# Patient Record
Sex: MALE | Race: WHITE | NOT HISPANIC OR LATINO | Employment: FULL TIME | URBAN - METROPOLITAN AREA
[De-identification: names, ages, dates, MRNs, and addresses within clinical notes are randomized per-mention and may not be internally consistent; named-entity substitution may affect disease eponyms.]

---

## 2019-11-18 ENCOUNTER — HISTORICAL (OUTPATIENT)
Dept: ADMINISTRATIVE | Facility: HOSPITAL | Age: 44
End: 2019-11-18

## 2020-10-07 ENCOUNTER — HISTORICAL (OUTPATIENT)
Dept: ADMINISTRATIVE | Facility: HOSPITAL | Age: 45
End: 2020-10-07

## 2020-11-01 LAB
INFLUENZA A ANTIGEN, POC: NEGATIVE
INFLUENZA B ANTIGEN, POC: NEGATIVE

## 2022-04-10 ENCOUNTER — HISTORICAL (OUTPATIENT)
Dept: ADMINISTRATIVE | Facility: HOSPITAL | Age: 47
End: 2022-04-10
Payer: COMMERCIAL

## 2022-04-11 ENCOUNTER — HISTORICAL (OUTPATIENT)
Dept: ADMINISTRATIVE | Facility: HOSPITAL | Age: 47
End: 2022-04-11
Payer: COMMERCIAL

## 2022-04-28 VITALS
HEIGHT: 69 IN | SYSTOLIC BLOOD PRESSURE: 144 MMHG | DIASTOLIC BLOOD PRESSURE: 72 MMHG | OXYGEN SATURATION: 98 % | WEIGHT: 216 LBS | BODY MASS INDEX: 31.99 KG/M2

## 2022-04-28 VITALS
SYSTOLIC BLOOD PRESSURE: 144 MMHG | DIASTOLIC BLOOD PRESSURE: 72 MMHG | OXYGEN SATURATION: 98 % | WEIGHT: 216 LBS | BODY MASS INDEX: 31.99 KG/M2 | HEIGHT: 69 IN

## 2022-06-17 ENCOUNTER — LAB VISIT (OUTPATIENT)
Dept: LAB | Facility: HOSPITAL | Age: 47
End: 2022-06-17
Attending: INTERNAL MEDICINE
Payer: COMMERCIAL

## 2022-06-17 DIAGNOSIS — E55.9 VITAMIN D DEFICIENCY: ICD-10-CM

## 2022-06-17 DIAGNOSIS — Z00.00 ROUTINE GENERAL MEDICAL EXAMINATION AT A HEALTH CARE FACILITY: ICD-10-CM

## 2022-06-17 DIAGNOSIS — I10 ESSENTIAL HYPERTENSION, MALIGNANT: Primary | ICD-10-CM

## 2022-06-17 DIAGNOSIS — Z12.5 SPECIAL SCREENING FOR MALIGNANT NEOPLASM OF PROSTATE: ICD-10-CM

## 2022-06-17 LAB
ALBUMIN SERPL-MCNC: 4.1 GM/DL (ref 3.5–5)
ALBUMIN/GLOB SERPL: 1.5 RATIO (ref 1.1–2)
ALP SERPL-CCNC: 46 UNIT/L (ref 40–150)
ALT SERPL-CCNC: 29 UNIT/L (ref 0–55)
AST SERPL-CCNC: 16 UNIT/L (ref 5–34)
BILIRUBIN DIRECT+TOT PNL SERPL-MCNC: 1 MG/DL
BUN SERPL-MCNC: 16.2 MG/DL (ref 8.9–20.6)
CALCIUM SERPL-MCNC: 10.6 MG/DL (ref 8.4–10.2)
CHLORIDE SERPL-SCNC: 111 MMOL/L (ref 98–107)
CHOLEST SERPL-MCNC: 171 MG/DL
CHOLEST/HDLC SERPL: 3 {RATIO} (ref 0–5)
CO2 SERPL-SCNC: 26 MMOL/L (ref 22–29)
CREAT SERPL-MCNC: 0.89 MG/DL (ref 0.73–1.18)
DEPRECATED CALCIDIOL+CALCIFEROL SERPL-MC: 24.2 NG/ML (ref 30–80)
ERYTHROCYTE [DISTWIDTH] IN BLOOD BY AUTOMATED COUNT: 12.6 % (ref 11.5–17)
EST. AVERAGE GLUCOSE BLD GHB EST-MCNC: 108.3 MG/DL
GLOBULIN SER-MCNC: 2.7 GM/DL (ref 2.4–3.5)
GLUCOSE SERPL-MCNC: 109 MG/DL (ref 74–100)
HBA1C MFR BLD: 5.4 %
HCT VFR BLD AUTO: 48 % (ref 42–52)
HDLC SERPL-MCNC: 54 MG/DL (ref 35–60)
HGB BLD-MCNC: 15.1 GM/DL (ref 14–18)
LDLC SERPL CALC-MCNC: 94 MG/DL (ref 50–140)
MCH RBC QN AUTO: 28.1 PG (ref 27–31)
MCHC RBC AUTO-ENTMCNC: 31.5 MG/DL (ref 33–36)
MCV RBC AUTO: 89.2 FL (ref 80–94)
PLATELET # BLD AUTO: 292 X10(3)/MCL (ref 130–400)
PMV BLD AUTO: 9.2 FL (ref 9.4–12.4)
POTASSIUM SERPL-SCNC: 4.9 MMOL/L (ref 3.5–5.1)
PROT SERPL-MCNC: 6.8 GM/DL (ref 6.4–8.3)
PSA SERPL-MCNC: 0.28 NG/ML
RBC # BLD AUTO: 5.38 X10(6)/MCL (ref 4.7–6.1)
SODIUM SERPL-SCNC: 144 MMOL/L (ref 136–145)
T3RU NFR SERPL: 38.69 % (ref 31–39)
T4 SERPL-MCNC: 5.99 UG/DL (ref 4.87–11.72)
TRIGL SERPL-MCNC: 115 MG/DL (ref 34–140)
TSH SERPL-ACNC: 1.38 UIU/ML (ref 0.35–4.94)
VLDLC SERPL CALC-MCNC: 23 MG/DL
WBC # SPEC AUTO: 4.3 X10(3)/MCL (ref 4.5–11.5)

## 2022-06-17 PROCEDURE — 83036 HEMOGLOBIN GLYCOSYLATED A1C: CPT

## 2022-06-17 PROCEDURE — 80061 LIPID PANEL: CPT

## 2022-06-17 PROCEDURE — 36415 COLL VENOUS BLD VENIPUNCTURE: CPT

## 2022-06-17 PROCEDURE — 84436 ASSAY OF TOTAL THYROXINE: CPT

## 2022-06-17 PROCEDURE — 84153 ASSAY OF PSA TOTAL: CPT

## 2022-06-17 PROCEDURE — 82306 VITAMIN D 25 HYDROXY: CPT

## 2022-06-17 PROCEDURE — 85027 COMPLETE CBC AUTOMATED: CPT

## 2022-06-17 PROCEDURE — 84479 ASSAY OF THYROID (T3 OR T4): CPT

## 2022-06-17 PROCEDURE — 80053 COMPREHEN METABOLIC PANEL: CPT

## 2022-06-17 PROCEDURE — 84443 ASSAY THYROID STIM HORMONE: CPT

## 2022-09-21 ENCOUNTER — HISTORICAL (OUTPATIENT)
Dept: ADMINISTRATIVE | Facility: HOSPITAL | Age: 47
End: 2022-09-21
Payer: COMMERCIAL

## 2022-10-03 ENCOUNTER — LAB VISIT (OUTPATIENT)
Dept: LAB | Facility: HOSPITAL | Age: 47
End: 2022-10-03
Attending: PHYSICIAN ASSISTANT
Payer: COMMERCIAL

## 2022-10-03 DIAGNOSIS — Z83.719 FAMILY HISTORY OF COLONIC POLYPS: ICD-10-CM

## 2022-10-03 DIAGNOSIS — K92.1 BLOOD IN STOOL: ICD-10-CM

## 2022-10-03 DIAGNOSIS — K62.5 HEMORRHAGE OF RECTUM AND ANUS: Primary | ICD-10-CM

## 2022-10-03 DIAGNOSIS — R12 HEARTBURN: ICD-10-CM

## 2022-10-03 LAB
ALBUMIN SERPL-MCNC: 4.4 GM/DL (ref 3.5–5)
ALBUMIN/GLOB SERPL: 1.7 RATIO (ref 1.1–2)
ALP SERPL-CCNC: 63 UNIT/L (ref 40–150)
ALT SERPL-CCNC: 33 UNIT/L (ref 0–55)
AST SERPL-CCNC: 22 UNIT/L (ref 5–34)
BASOPHILS # BLD AUTO: 0.03 X10(3)/MCL (ref 0–0.2)
BASOPHILS NFR BLD AUTO: 0.5 %
BILIRUBIN DIRECT+TOT PNL SERPL-MCNC: 1.2 MG/DL
BUN SERPL-MCNC: 12.8 MG/DL (ref 8.9–20.6)
CALCIUM SERPL-MCNC: 11.2 MG/DL (ref 8.4–10.2)
CHLORIDE SERPL-SCNC: 109 MMOL/L (ref 98–107)
CO2 SERPL-SCNC: 25 MMOL/L (ref 22–29)
CREAT SERPL-MCNC: 0.86 MG/DL (ref 0.73–1.18)
CRP SERPL-MCNC: 0.1 MG/L
EOSINOPHIL # BLD AUTO: 0.08 X10(3)/MCL (ref 0–0.9)
EOSINOPHIL NFR BLD AUTO: 1.4 %
ERYTHROCYTE [DISTWIDTH] IN BLOOD BY AUTOMATED COUNT: 13 % (ref 11.5–17)
GFR SERPLBLD CREATININE-BSD FMLA CKD-EPI: >60 MLS/MIN/1.73/M2
GLOBULIN SER-MCNC: 2.6 GM/DL (ref 2.4–3.5)
GLUCOSE SERPL-MCNC: 117 MG/DL (ref 74–100)
HCT VFR BLD AUTO: 46 % (ref 42–52)
HGB BLD-MCNC: 15.1 GM/DL (ref 14–18)
IMM GRANULOCYTES # BLD AUTO: 0.01 X10(3)/MCL (ref 0–0.04)
IMM GRANULOCYTES NFR BLD AUTO: 0.2 %
LYMPHOCYTES # BLD AUTO: 1.89 X10(3)/MCL (ref 0.6–4.6)
LYMPHOCYTES NFR BLD AUTO: 31.9 %
MCH RBC QN AUTO: 29.1 PG (ref 27–31)
MCHC RBC AUTO-ENTMCNC: 32.8 MG/DL (ref 33–36)
MCV RBC AUTO: 88.6 FL (ref 80–94)
MONOCYTES # BLD AUTO: 0.63 X10(3)/MCL (ref 0.1–1.3)
MONOCYTES NFR BLD AUTO: 10.6 %
NEUTROPHILS # BLD AUTO: 3.3 X10(3)/MCL (ref 2.1–9.2)
NEUTROPHILS NFR BLD AUTO: 55.4 %
PLATELET # BLD AUTO: 322 X10(3)/MCL (ref 130–400)
PMV BLD AUTO: 9.3 FL (ref 7.4–10.4)
POTASSIUM SERPL-SCNC: 5 MMOL/L (ref 3.5–5.1)
PROT SERPL-MCNC: 7 GM/DL (ref 6.4–8.3)
RBC # BLD AUTO: 5.19 X10(6)/MCL (ref 4.7–6.1)
SODIUM SERPL-SCNC: 143 MMOL/L (ref 136–145)
WBC # SPEC AUTO: 5.9 X10(3)/MCL (ref 4.5–11.5)

## 2022-10-03 PROCEDURE — 36415 COLL VENOUS BLD VENIPUNCTURE: CPT

## 2022-10-03 PROCEDURE — 86140 C-REACTIVE PROTEIN: CPT

## 2022-10-03 PROCEDURE — 80053 COMPREHEN METABOLIC PANEL: CPT

## 2022-10-03 PROCEDURE — 85025 COMPLETE CBC W/AUTO DIFF WBC: CPT

## 2022-12-23 ENCOUNTER — HOSPITAL ENCOUNTER (EMERGENCY)
Facility: HOSPITAL | Age: 47
Discharge: HOME | End: 2022-12-23
Attending: FAMILY MEDICINE
Payer: OTHER MISCELLANEOUS

## 2022-12-23 VITALS
TEMPERATURE: 98 F | OXYGEN SATURATION: 97 % | SYSTOLIC BLOOD PRESSURE: 140 MMHG | DIASTOLIC BLOOD PRESSURE: 90 MMHG | HEART RATE: 88 BPM | RESPIRATION RATE: 18 BRPM

## 2022-12-23 DIAGNOSIS — S60.221A CONTUSION OF RIGHT HAND, INITIAL ENCOUNTER: Primary | ICD-10-CM

## 2022-12-23 DIAGNOSIS — S62.639A CLOSED AVULSION FRACTURE OF DISTAL PHALANX OF FINGER, INITIAL ENCOUNTER: ICD-10-CM

## 2022-12-23 PROCEDURE — 99283 EMERGENCY DEPT VISIT LOW MDM: CPT | Mod: 25

## 2022-12-23 PROCEDURE — 29130 APPL FINGER SPLINT STATIC: CPT | Mod: F8

## 2022-12-23 RX ORDER — DICLOFENAC SODIUM 50 MG/1
50 TABLET, DELAYED RELEASE ORAL 3 TIMES DAILY PRN
Qty: 21 TABLET | Refills: 0 | Status: SHIPPED | OUTPATIENT
Start: 2022-12-23 | End: 2022-12-30

## 2022-12-23 NOTE — Clinical Note
"Inga Pichardoelvi Rayo was seen and treated in our emergency department on 12/23/2022.  He may return with limitations on 12/30/2022.  Must wear finger splint for one week     Sincerely,      ANGELICA Rodas    "

## 2022-12-24 NOTE — ED PROVIDER NOTES
Encounter Date: 12/23/2022       History     Chief Complaint   Patient presents with    Hand Pain     Injured rt  4th  /5th fingers while working     47 year old male states he was trying to jump off his trailer and grabbed a ratchet strap to hold on to. He states he slipped and slid down the strap and hit his right hand on the ratchet. He reports pain and swelling to distal right 4th and 5th fingers.     The history is provided by the patient. No  was used.   Review of patient's allergies indicates:  No Known Allergies  No past medical history on file.  No past surgical history on file.  No family history on file.     Review of Systems   Musculoskeletal:  Positive for arthralgias and joint swelling.   Skin:  Negative for color change and wound.   All other systems reviewed and are negative.    Physical Exam     Initial Vitals [12/23/22 1851]   BP Pulse Resp Temp SpO2   (!) 140/90 88 18 97.6 °F (36.4 °C) 97 %      MAP       --         Physical Exam    Constitutional: He appears well-developed and well-nourished.   HENT:   Head: Normocephalic.   Eyes: EOM are normal.   Neck: Neck supple.   Cardiovascular:  Intact distal pulses.           Pulmonary/Chest: No respiratory distress.   Abdominal: He exhibits no distension.   Musculoskeletal:        Arms:       Cervical back: Neck supple.     Neurological: He is alert and oriented to person, place, and time.   Skin: Skin is warm and dry. Capillary refill takes less than 2 seconds. No erythema.   Psychiatric: He has a normal mood and affect.       ED Course   Splint Application    Date/Time: 12/23/2022 7:20 PM  Performed by: Penny Crystal RN  Authorized by: ANGELICA Rodas   Consent Done: Emergent Situation  Location details: right ring finger  Splint type: static finger  Supplies used: aluminum splint  Post-procedure: The splinted body part was neurovascularly unchanged following the procedure.  Patient tolerance: Patient tolerated the  procedure well with no immediate complications  Comments: Right 4th and 5th fingers splinted using metal finger splint      Labs Reviewed - No data to display       Imaging Results              X-Ray Hand 3 View Right (Final result)  Result time 12/23/22 19:13:17      Final result by Finn Leone MD (12/23/22 19:13:17)                   Impression:      Small avulsed bony fragment adjacent to the distal interphalangeal joint of the 5th finger.      Electronically signed by: Finn Leone  Date:    12/23/2022  Time:    19:13               Narrative:    EXAMINATION:  XR HAND COMPLETE 3 VIEW RIGHT    CLINICAL HISTORY:  blunt trauma to right 4th and 5th fingers;    TECHNIQUE:  Three views    COMPARISON:  None available.    FINDINGS:  There is small avulsed bony fragment adjacent to the distal interphalangeal joint of the 5th finger seen on lateral radiograph.  This is of indeterminate age.  Please correlate clinically for local pain.  No other acute osseous abnormality identified.                                       Medications - No data to display  Medical Decision Making:   Differential Diagnosis:   Contusion, closed fracture, dislocation  Clinical Tests:   Radiological Study: Ordered and Reviewed  ED Management:  Small avulsion bony fragment of the distal 5th interphalangeal joint.  Age is indeterminate.  This is not the location where patient is having pain.  Will splint the 4th and 5th fingers and have him follow-up with PCP.                         Clinical Impression:   Final diagnoses:  [S60.221A] Contusion of right hand, initial encounter (Primary)  [V82.144I] Closed avulsion fracture of distal phalanx of finger, initial encounter        ED Disposition Condition    Discharge Stable          ED Prescriptions       Medication Sig Dispense Start Date End Date Auth. Provider    diclofenac (VOLTAREN) 50 MG EC tablet Take 1 tablet (50 mg total) by mouth 3 (three) times daily as needed (pain). 21 tablet 12/23/2022  12/30/2022 ANGELICA Rodas          Follow-up Information    None          ANGELICA Rodas  12/23/22 1928

## 2022-12-24 NOTE — ED NOTES
Right forth and fifth digit aluminum finger splint applied to both fingers, tomasa taped. Neurovascular intact.

## 2023-10-21 ENCOUNTER — LAB VISIT (OUTPATIENT)
Dept: LAB | Facility: HOSPITAL | Age: 48
End: 2023-10-21
Attending: FAMILY MEDICINE
Payer: COMMERCIAL

## 2023-10-21 DIAGNOSIS — I51.9 MYXEDEMA HEART DISEASE: ICD-10-CM

## 2023-10-21 DIAGNOSIS — E78.5 HYPERLIPIDEMIA, UNSPECIFIED HYPERLIPIDEMIA TYPE: ICD-10-CM

## 2023-10-21 DIAGNOSIS — G47.00 PERSISTENT DISORDER OF INITIATING OR MAINTAINING SLEEP: ICD-10-CM

## 2023-10-21 DIAGNOSIS — R06.83 SNORING: ICD-10-CM

## 2023-10-21 DIAGNOSIS — E03.9 MYXEDEMA HEART DISEASE: ICD-10-CM

## 2023-10-21 DIAGNOSIS — I10 ESSENTIAL HYPERTENSION, MALIGNANT: ICD-10-CM

## 2023-10-21 DIAGNOSIS — M25.30 INSTABILITY OF JOINT: ICD-10-CM

## 2023-10-21 DIAGNOSIS — R07.9 CHEST PAIN, UNSPECIFIED TYPE: Primary | ICD-10-CM

## 2023-10-21 DIAGNOSIS — K62.5 HEMORRHAGE OF RECTUM AND ANUS: ICD-10-CM

## 2023-10-21 DIAGNOSIS — M54.50 LUMBAGO: ICD-10-CM

## 2023-10-21 DIAGNOSIS — H93.19 SUBJECTIVE TINNITUS, UNSPECIFIED LATERALITY: ICD-10-CM

## 2023-10-21 LAB
ALBUMIN SERPL-MCNC: 4.2 G/DL (ref 3.5–5)
ALBUMIN/GLOB SERPL: 1.6 RATIO (ref 1.1–2)
ALP SERPL-CCNC: 55 UNIT/L (ref 40–150)
ALT SERPL-CCNC: 43 UNIT/L (ref 0–55)
APPEARANCE UR: CLEAR
AST SERPL-CCNC: 25 UNIT/L (ref 5–34)
BACTERIA #/AREA URNS AUTO: NORMAL /HPF
BILIRUB SERPL-MCNC: 1.4 MG/DL
BILIRUB UR QL STRIP.AUTO: NEGATIVE
BUN SERPL-MCNC: 17.1 MG/DL (ref 8.9–20.6)
CALCIUM SERPL-MCNC: 10.5 MG/DL (ref 8.4–10.2)
CHLORIDE SERPL-SCNC: 110 MMOL/L (ref 98–107)
CHOLEST SERPL-MCNC: 152 MG/DL
CHOLEST/HDLC SERPL: 3 {RATIO} (ref 0–5)
CO2 SERPL-SCNC: 25 MMOL/L (ref 22–29)
COLOR UR AUTO: YELLOW
CREAT SERPL-MCNC: 1.04 MG/DL (ref 0.73–1.18)
ERYTHROCYTE [DISTWIDTH] IN BLOOD BY AUTOMATED COUNT: 13 % (ref 11.5–17)
ERYTHROCYTE [SEDIMENTATION RATE] IN BLOOD: 5 MM/HR (ref 0–15)
GFR SERPLBLD CREATININE-BSD FMLA CKD-EPI: >60 MLS/MIN/1.73/M2
GLOBULIN SER-MCNC: 2.7 GM/DL (ref 2.4–3.5)
GLUCOSE SERPL-MCNC: 115 MG/DL (ref 74–100)
GLUCOSE UR QL STRIP.AUTO: NEGATIVE
HCT VFR BLD AUTO: 46.8 % (ref 42–52)
HDLC SERPL-MCNC: 44 MG/DL (ref 35–60)
HGB BLD-MCNC: 14.8 G/DL (ref 14–18)
KETONES UR QL STRIP.AUTO: NEGATIVE
LDLC SERPL CALC-MCNC: 92 MG/DL (ref 50–140)
LEUKOCYTE ESTERASE UR QL STRIP.AUTO: NEGATIVE
MCH RBC QN AUTO: 28.5 PG (ref 27–31)
MCHC RBC AUTO-ENTMCNC: 31.6 G/DL (ref 33–36)
MCV RBC AUTO: 90.2 FL (ref 80–94)
NITRITE UR QL STRIP.AUTO: NEGATIVE
PH UR STRIP.AUTO: 6 [PH]
PLATELET # BLD AUTO: 294 X10(3)/MCL (ref 130–400)
PMV BLD AUTO: 9.9 FL (ref 7.4–10.4)
POTASSIUM SERPL-SCNC: 5 MMOL/L (ref 3.5–5.1)
PROT SERPL-MCNC: 6.9 GM/DL (ref 6.4–8.3)
PROT UR QL STRIP.AUTO: NEGATIVE
RBC # BLD AUTO: 5.19 X10(6)/MCL (ref 4.7–6.1)
RBC #/AREA URNS AUTO: NORMAL /HPF
RBC UR QL AUTO: ABNORMAL
SODIUM SERPL-SCNC: 142 MMOL/L (ref 136–145)
SP GR UR STRIP.AUTO: 1.02 (ref 1–1.03)
SQUAMOUS #/AREA URNS AUTO: NORMAL /HPF
TRIGL SERPL-MCNC: 82 MG/DL (ref 34–140)
TSH SERPL-ACNC: 1.77 UIU/ML (ref 0.35–4.94)
URATE SERPL-MCNC: 7.3 MG/DL (ref 3.5–7.2)
UROBILINOGEN UR STRIP-ACNC: 0.2
VLDLC SERPL CALC-MCNC: 16 MG/DL
WBC # SPEC AUTO: 4.84 X10(3)/MCL (ref 4.5–11.5)
WBC #/AREA URNS AUTO: NORMAL /HPF

## 2023-10-21 PROCEDURE — 81001 URINALYSIS AUTO W/SCOPE: CPT

## 2023-10-21 PROCEDURE — 36415 COLL VENOUS BLD VENIPUNCTURE: CPT

## 2023-10-21 PROCEDURE — 84443 ASSAY THYROID STIM HORMONE: CPT

## 2023-10-21 PROCEDURE — 85652 RBC SED RATE AUTOMATED: CPT

## 2023-10-21 PROCEDURE — 86039 ANTINUCLEAR ANTIBODIES (ANA): CPT

## 2023-10-21 PROCEDURE — 86431 RHEUMATOID FACTOR QUANT: CPT

## 2023-10-21 PROCEDURE — 80061 LIPID PANEL: CPT

## 2023-10-21 PROCEDURE — 80053 COMPREHEN METABOLIC PANEL: CPT

## 2023-10-21 PROCEDURE — 85027 COMPLETE CBC AUTOMATED: CPT

## 2023-10-21 PROCEDURE — 84550 ASSAY OF BLOOD/URIC ACID: CPT

## 2023-10-24 LAB — ANA SER QL HEP2 SUBST: NORMAL

## 2023-10-25 LAB — RHEUMATOID FACTOR IGA QUANTITATIVE (OLG): 0.9 IU/ML

## 2024-05-11 ENCOUNTER — LAB VISIT (OUTPATIENT)
Dept: LAB | Facility: HOSPITAL | Age: 49
End: 2024-05-11
Attending: NURSE PRACTITIONER
Payer: COMMERCIAL

## 2024-05-11 DIAGNOSIS — N39.0 URINARY TRACT INFECTION, SITE NOT SPECIFIED: ICD-10-CM

## 2024-05-11 DIAGNOSIS — M10.9 GOUT, UNSPECIFIED CAUSE, UNSPECIFIED CHRONICITY, UNSPECIFIED SITE: Primary | ICD-10-CM

## 2024-05-11 DIAGNOSIS — R07.9 CHEST PAIN, UNSPECIFIED TYPE: ICD-10-CM

## 2024-05-11 LAB
ALBUMIN SERPL-MCNC: 4.1 G/DL (ref 3.5–5)
ALBUMIN/GLOB SERPL: 1.6 RATIO (ref 1.1–2)
ALP SERPL-CCNC: 55 UNIT/L (ref 40–150)
ALT SERPL-CCNC: 30 UNIT/L (ref 0–55)
AST SERPL-CCNC: 21 UNIT/L (ref 5–34)
BACTERIA #/AREA URNS AUTO: NORMAL /HPF
BILIRUB SERPL-MCNC: 1.5 MG/DL
BILIRUB UR QL STRIP.AUTO: NEGATIVE
BUN SERPL-MCNC: 16.1 MG/DL (ref 8.9–20.6)
CALCIUM SERPL-MCNC: 11.1 MG/DL (ref 8.4–10.2)
CHLORIDE SERPL-SCNC: 111 MMOL/L (ref 98–107)
CLARITY UR: CLEAR
CO2 SERPL-SCNC: 27 MMOL/L (ref 22–29)
COLOR UR AUTO: YELLOW
CREAT SERPL-MCNC: 0.84 MG/DL (ref 0.73–1.18)
GFR SERPLBLD CREATININE-BSD FMLA CKD-EPI: >60 ML/MIN/1.73/M2
GLOBULIN SER-MCNC: 2.6 GM/DL (ref 2.4–3.5)
GLUCOSE SERPL-MCNC: 117 MG/DL (ref 74–100)
GLUCOSE UR QL STRIP: NEGATIVE
HGB UR QL STRIP: NEGATIVE
KETONES UR QL STRIP: NEGATIVE
LEUKOCYTE ESTERASE UR QL STRIP: NEGATIVE
NITRITE UR QL STRIP: NEGATIVE
PH UR STRIP: 6 [PH]
POTASSIUM SERPL-SCNC: 5.2 MMOL/L (ref 3.5–5.1)
PROT SERPL-MCNC: 6.7 GM/DL (ref 6.4–8.3)
PROT UR QL STRIP: NEGATIVE
RBC #/AREA URNS AUTO: NORMAL /HPF
SODIUM SERPL-SCNC: 144 MMOL/L (ref 136–145)
SP GR UR STRIP.AUTO: >=1.03 (ref 1–1.03)
SQUAMOUS #/AREA URNS AUTO: NORMAL /HPF
TSH SERPL-ACNC: 1.09 UIU/ML (ref 0.35–4.94)
URATE SERPL-MCNC: 6.3 MG/DL (ref 3.5–7.2)
UROBILINOGEN UR STRIP-ACNC: 1
WBC #/AREA URNS AUTO: NORMAL /HPF

## 2024-05-11 PROCEDURE — 84550 ASSAY OF BLOOD/URIC ACID: CPT

## 2024-05-11 PROCEDURE — 81001 URINALYSIS AUTO W/SCOPE: CPT

## 2024-05-11 PROCEDURE — 80053 COMPREHEN METABOLIC PANEL: CPT

## 2024-05-11 PROCEDURE — 84443 ASSAY THYROID STIM HORMONE: CPT

## 2024-05-11 PROCEDURE — 87086 URINE CULTURE/COLONY COUNT: CPT

## 2024-05-11 PROCEDURE — 36415 COLL VENOUS BLD VENIPUNCTURE: CPT

## 2024-05-13 LAB — BACTERIA UR CULT: NO GROWTH

## 2024-05-21 ENCOUNTER — LAB VISIT (OUTPATIENT)
Dept: LAB | Facility: HOSPITAL | Age: 49
End: 2024-05-21
Attending: FAMILY MEDICINE
Payer: COMMERCIAL

## 2024-05-21 DIAGNOSIS — E83.52 HYPERCALCEMIA: ICD-10-CM

## 2024-05-21 DIAGNOSIS — R73.9 BLOOD GLUCOSE ELEVATED: Primary | ICD-10-CM

## 2024-05-21 LAB
EST. AVERAGE GLUCOSE BLD GHB EST-MCNC: 108.3 MG/DL
HBA1C MFR BLD: 5.4 %
PTH-INTACT SERPL-MCNC: 111.6 PG/ML (ref 8.7–77)

## 2024-05-21 PROCEDURE — 36415 COLL VENOUS BLD VENIPUNCTURE: CPT

## 2024-05-21 PROCEDURE — 83970 ASSAY OF PARATHORMONE: CPT

## 2024-05-21 PROCEDURE — 83036 HEMOGLOBIN GLYCOSYLATED A1C: CPT

## 2024-05-29 DIAGNOSIS — E21.3 HPTH (HYPERPARATHYROIDISM): Primary | ICD-10-CM

## 2024-06-03 ENCOUNTER — HOSPITAL ENCOUNTER (OUTPATIENT)
Dept: RADIOLOGY | Facility: HOSPITAL | Age: 49
Discharge: HOME | End: 2024-06-03
Attending: FAMILY MEDICINE
Payer: COMMERCIAL

## 2024-06-03 DIAGNOSIS — E21.3 HPTH (HYPERPARATHYROIDISM): ICD-10-CM

## 2024-06-03 PROCEDURE — 76536 US EXAM OF HEAD AND NECK: CPT | Mod: TC

## 2024-06-05 ENCOUNTER — APPOINTMENT (OUTPATIENT)
Dept: LAB | Facility: HOSPITAL | Age: 49
End: 2024-06-05
Attending: FAMILY MEDICINE
Payer: COMMERCIAL

## 2024-06-05 PROCEDURE — 82570 ASSAY OF URINE CREATININE: CPT

## 2024-06-05 PROCEDURE — 84156 ASSAY OF PROTEIN URINE: CPT

## 2024-11-08 ENCOUNTER — LAB VISIT (OUTPATIENT)
Dept: LAB | Facility: HOSPITAL | Age: 49
End: 2024-11-08
Attending: FAMILY MEDICINE
Payer: COMMERCIAL

## 2024-11-08 DIAGNOSIS — E78.5 HYPERLIPIDEMIA, UNSPECIFIED HYPERLIPIDEMIA TYPE: ICD-10-CM

## 2024-11-08 DIAGNOSIS — I51.9 MYXEDEMA HEART DISEASE: ICD-10-CM

## 2024-11-08 DIAGNOSIS — I10 ESSENTIAL HYPERTENSION, MALIGNANT: ICD-10-CM

## 2024-11-08 DIAGNOSIS — E03.9 MYXEDEMA HEART DISEASE: ICD-10-CM

## 2024-11-08 DIAGNOSIS — E21.3 HYPERPARATHYROIDISM, UNSPECIFIED: ICD-10-CM

## 2024-11-08 DIAGNOSIS — M25.50 PAIN IN JOINT, MULTIPLE SITES: ICD-10-CM

## 2024-11-08 DIAGNOSIS — M10.9 GOUT, UNSPECIFIED CAUSE, UNSPECIFIED CHRONICITY, UNSPECIFIED SITE: Primary | ICD-10-CM

## 2024-11-08 LAB
ALBUMIN SERPL-MCNC: 4.4 G/DL (ref 3.5–5)
ALBUMIN/GLOB SERPL: 1.7 RATIO (ref 1.1–2)
ALP SERPL-CCNC: 62 UNIT/L (ref 40–150)
ALT SERPL-CCNC: 34 UNIT/L (ref 0–55)
ANION GAP SERPL CALC-SCNC: 5 MEQ/L
AST SERPL-CCNC: 21 UNIT/L (ref 5–34)
BILIRUB SERPL-MCNC: 1.6 MG/DL
BUN SERPL-MCNC: 17.5 MG/DL (ref 8.9–20.6)
CALCIUM SERPL-MCNC: 11.1 MG/DL (ref 8.4–10.2)
CHLORIDE SERPL-SCNC: 109 MMOL/L (ref 98–107)
CO2 SERPL-SCNC: 27 MMOL/L (ref 22–29)
CREAT SERPL-MCNC: 0.92 MG/DL (ref 0.72–1.25)
CREAT/UREA NIT SERPL: 19
GFR SERPLBLD CREATININE-BSD FMLA CKD-EPI: >60 ML/MIN/1.73/M2
GLOBULIN SER-MCNC: 2.6 GM/DL (ref 2.4–3.5)
GLUCOSE SERPL-MCNC: 102 MG/DL (ref 74–100)
POTASSIUM SERPL-SCNC: 5.6 MMOL/L (ref 3.5–5.1)
PROT SERPL-MCNC: 7 GM/DL (ref 6.4–8.3)
PTH-INTACT SERPL-MCNC: 57.3 PG/ML (ref 8.7–77)
SODIUM SERPL-SCNC: 141 MMOL/L (ref 136–145)
TSH SERPL-ACNC: 1.35 UIU/ML (ref 0.35–4.94)
URATE SERPL-MCNC: 5.5 MG/DL (ref 3.5–7.2)

## 2024-11-08 PROCEDURE — 84443 ASSAY THYROID STIM HORMONE: CPT

## 2024-11-08 PROCEDURE — 36415 COLL VENOUS BLD VENIPUNCTURE: CPT

## 2024-11-08 PROCEDURE — 84550 ASSAY OF BLOOD/URIC ACID: CPT

## 2024-11-08 PROCEDURE — 80053 COMPREHEN METABOLIC PANEL: CPT

## 2024-11-08 PROCEDURE — 83970 ASSAY OF PARATHORMONE: CPT

## 2024-12-02 ENCOUNTER — HOSPITAL ENCOUNTER (OUTPATIENT)
Dept: RADIOLOGY | Facility: CLINIC | Age: 49
Discharge: HOME | End: 2024-12-02
Attending: ORTHOPAEDIC SURGERY
Payer: COMMERCIAL

## 2024-12-02 ENCOUNTER — OFFICE VISIT (OUTPATIENT)
Dept: ORTHOPEDICS | Facility: CLINIC | Age: 49
End: 2024-12-02
Payer: COMMERCIAL

## 2024-12-02 VITALS
BODY MASS INDEX: 32 KG/M2 | HEART RATE: 77 BPM | DIASTOLIC BLOOD PRESSURE: 82 MMHG | SYSTOLIC BLOOD PRESSURE: 126 MMHG | HEIGHT: 69 IN | WEIGHT: 216.06 LBS

## 2024-12-02 DIAGNOSIS — M54.2 CERVICALGIA: ICD-10-CM

## 2024-12-02 DIAGNOSIS — M25.511 ACUTE PAIN OF RIGHT SHOULDER: ICD-10-CM

## 2024-12-02 DIAGNOSIS — M54.12 CERVICAL RADICULOPATHY: Primary | ICD-10-CM

## 2024-12-02 PROCEDURE — 3044F HG A1C LEVEL LT 7.0%: CPT | Mod: CPTII,,, | Performed by: ORTHOPAEDIC SURGERY

## 2024-12-02 PROCEDURE — 73030 X-RAY EXAM OF SHOULDER: CPT | Mod: RT,,, | Performed by: ORTHOPAEDIC SURGERY

## 2024-12-02 PROCEDURE — 3008F BODY MASS INDEX DOCD: CPT | Mod: CPTII,,, | Performed by: ORTHOPAEDIC SURGERY

## 2024-12-02 PROCEDURE — 1159F MED LIST DOCD IN RCRD: CPT | Mod: CPTII,,, | Performed by: ORTHOPAEDIC SURGERY

## 2024-12-02 PROCEDURE — 72050 X-RAY EXAM NECK SPINE 4/5VWS: CPT | Mod: ,,, | Performed by: ORTHOPAEDIC SURGERY

## 2024-12-02 PROCEDURE — 3074F SYST BP LT 130 MM HG: CPT | Mod: CPTII,,, | Performed by: ORTHOPAEDIC SURGERY

## 2024-12-02 PROCEDURE — 99203 OFFICE O/P NEW LOW 30 MIN: CPT | Mod: ,,, | Performed by: ORTHOPAEDIC SURGERY

## 2024-12-02 PROCEDURE — 4010F ACE/ARB THERAPY RXD/TAKEN: CPT | Mod: CPTII,,, | Performed by: ORTHOPAEDIC SURGERY

## 2024-12-02 PROCEDURE — 3079F DIAST BP 80-89 MM HG: CPT | Mod: CPTII,,, | Performed by: ORTHOPAEDIC SURGERY

## 2024-12-02 RX ORDER — MELOXICAM 15 MG/1
15 TABLET ORAL DAILY
Qty: 30 TABLET | Refills: 0 | Status: SHIPPED | OUTPATIENT
Start: 2024-12-02

## 2024-12-02 RX ORDER — MELOXICAM 15 MG/1
15 TABLET ORAL DAILY PRN
COMMUNITY
Start: 2024-11-11

## 2024-12-02 RX ORDER — LEVOTHYROXINE SODIUM 25 UG/1
25 TABLET ORAL
COMMUNITY
Start: 2024-11-11

## 2024-12-02 RX ORDER — ZOLPIDEM TARTRATE 10 MG/1
TABLET ORAL
COMMUNITY
Start: 2024-11-05

## 2024-12-02 RX ORDER — ALLOPURINOL 100 MG/1
100 TABLET ORAL
COMMUNITY
Start: 2024-11-11

## 2024-12-02 RX ORDER — ATORVASTATIN CALCIUM 80 MG/1
80 TABLET, FILM COATED ORAL
COMMUNITY
Start: 2024-10-12

## 2024-12-02 RX ORDER — LISINOPRIL AND HYDROCHLOROTHIAZIDE 12.5; 2 MG/1; MG/1
1 TABLET ORAL
COMMUNITY
Start: 2024-11-11

## 2024-12-02 RX ORDER — CINACALCET 30 MG/1
30 TABLET, FILM COATED ORAL
COMMUNITY
Start: 2024-11-22

## 2024-12-02 RX ORDER — METHYLPREDNISOLONE 4 MG/1
TABLET ORAL
Qty: 21 EACH | Refills: 0 | Status: SHIPPED | OUTPATIENT
Start: 2024-12-02 | End: 2024-12-23

## 2024-12-02 NOTE — PROGRESS NOTES
Chief Complaint:   Chief Complaint   Patient presents with    Right Shoulder - Pain    Pain     New patient here with Right shoulder pain x1 month. C/o radiating pain from neck to finger tips - pointer and middle finger stays numb. Had injections in past with [facility]. X-rays today.        Consulting Physician: No ref. provider found    History of present illness:    he  is a pleasant 49 y.o. year old male who presents with right sided neck and arm pain for the past month. Denies injury. He has tingling down the arm with numbness to the index and long fingers. He feels symptoms with neck flexion. He denies shoulder pain. He's had previous LESIs with good results, no intervention on the cervical spine yet.     Past Medical History:   Diagnosis Date    High cholesterol     Hypertension     Thyroid disease        Past Surgical History:   Procedure Laterality Date    ANKLE FRACTURE SURGERY         Current Outpatient Medications   Medication Sig    allopurinoL (ZYLOPRIM) 100 MG tablet Take 100 mg by mouth.    atorvastatin (LIPITOR) 80 MG tablet Take 80 mg by mouth.    cinacalcet (SENSIPAR) 30 MG Tab Take 30 mg by mouth.    levothyroxine (SYNTHROID) 25 MCG tablet Take 25 mcg by mouth.    lisinopriL-hydrochlorothiazide (PRINZIDE,ZESTORETIC) 20-12.5 mg per tablet Take 1 tablet by mouth.    meloxicam (MOBIC) 15 MG tablet Take 15 mg by mouth daily as needed.    zolpidem (AMBIEN) 10 mg Tab TAKE 1 TABLET BY MOUTH NIGHTLY AT BEDTIME FOR SLEEP    meloxicam (MOBIC) 15 MG tablet Take 1 tablet (15 mg total) by mouth once daily.    methylPREDNISolone (MEDROL DOSEPACK) 4 mg tablet use as directed     No current facility-administered medications for this visit.       Review of patient's allergies indicates:  No Known Allergies    Family History   Problem Relation Name Age of Onset    Other (stents) Father         Social History     Socioeconomic History    Marital status:    Tobacco Use    Smoking status: Never     Passive  "exposure: Never    Smokeless tobacco: Never   Substance and Sexual Activity    Alcohol use: Yes    Drug use: Never       Review of Systems:    Constitution:   Denies chills, fever, and sweats.  HENT:   Denies headaches or blurry vision.  Cardiovascular:  Denies chest pain or irregular heart beat.  Respiratory:   Denies cough or shortness of breath.  Gastrointestinal:  Denies abdominal pain, nausea, or vomiting.  Musculoskeletal:   Denies muscle cramps.  Neurological:   Denies dizziness or focal weakness.  Psychiatric/Behavior: Normal mental status.  Hematology/Lymph:  Denies bleeding problem or easy bruising/bleeding.  Skin:    Denies rash or suspicious lesions.    Examination:    Vital Signs:    Vitals:    12/02/24 1440 12/02/24 1441   BP: 126/82    Pulse: 77    Weight: 98 kg (216 lb 0.8 oz)    Height: 5' 9.02" (1.753 m)    PainSc:    5       Body mass index is 31.89 kg/m².    Constitution:   Well-developed, well nourished patient in no acute distress.  Neurological:   Alert and oriented x 3 and cooperative to examination.     Psychiatric/Behavior: Normal mental status.  Respiratory:   No shortness of breath.  Cards:    Pulses palpable and symmetric, brisk cap refill   Eyes:    Extraoccular muscles intact  Skin:    No scars, rash or suspicious lesions.    MSK:   Neck ROM full, pain with neck flexion. Nontender to palpation. SILT distally. Hand grasps equal.     Shoulder Exam:                   Right        Left  Skin:                                   Normal     Normal  AC joint tenderness:           None         None  Forward Flexion:                180            180  Abduction:                          180           180  External Rotation:               80              80  Internal Rotation:                80             80  Supraspinatus stress test: Neg           Neg  [place]'s Impingement:     Neg           Neg  Neer Impingement:            Neg           Neg  Apprehension:                   Neg           " Neg  [place]'s:                           Neg           Neg  Speed's test:                     Neg            Neg  Strength:  External Rotation:           5/5                5/5  Lift Off/belly press:          5/5                5/5    N-V status:                   Intact             Intact          Imaging: X-rays ordered and images interpreted today personally by me of 4 views of the right shoulder show normal bony alignment. 5 views of the cervical spine show degenerative changes.        Assessment: Cervical radiculopathy  -     X-ray Shoulder 2 or More Views Right; Future; Expected date: 12/02/2024  -     X-Ray Cervical Spine Complete 5 view; Future; Expected date: 12/02/2024    Cervicalgia  -     MRI Cervical Spine Without Contrast; Future; Expected date: 12/02/2024    Other orders  -     methylPREDNISolone (MEDROL DOSEPACK) 4 mg tablet; use as directed  Dispense: 21 each; Refill: 0  -     meloxicam (MOBIC) 15 MG tablet; Take 1 tablet (15 mg total) by mouth once daily.  Dispense: 30 tablet; Refill: 0        Plan:  Medrol dose pack and Mobic sent to his pharmacy.  MRI cervical spine ordered. We will start him in formal therapy. Refer to pain management. He can follow up here if he has any issues.     Nir Villarreal MD personally performed the services described in this documentation, including but not limited to patient's history, physical examination, and assessment and plan of care. All medical record entries made by MONIKA Browne were performed at his direction and in his presence. The medical record was reviewed and is accurate and complete.

## 2024-12-03 DIAGNOSIS — M54.12 CERVICAL RADICULOPATHY: Primary | ICD-10-CM

## 2024-12-27 ENCOUNTER — TELEPHONE (OUTPATIENT)
Dept: ORTHOPEDICS | Facility: CLINIC | Age: 49
End: 2024-12-27
Payer: COMMERCIAL

## 2024-12-27 NOTE — TELEPHONE ENCOUNTER
Received voicemail from [facility] that they could not proceed with MRI of cervical spine due to there being a piece of metal close to his carotid artery that the patient did not know about. Called back and put in order for a CT scan instead of MRI.

## 2025-01-02 ENCOUNTER — TELEPHONE (OUTPATIENT)
Dept: ORTHOPEDICS | Facility: CLINIC | Age: 50
End: 2025-01-02
Payer: COMMERCIAL

## 2025-01-02 NOTE — TELEPHONE ENCOUNTER
Patient would like a call back with some MRI results. Awaiting call from  Pain Management.   Ph. [phone]

## 2025-01-07 ENCOUNTER — TELEPHONE (OUTPATIENT)
Dept: ORTHOPEDICS | Facility: CLINIC | Age: 50
End: 2025-01-07
Payer: COMMERCIAL

## 2025-01-07 NOTE — TELEPHONE ENCOUNTER
[facility] [place] in chart for review - (patient called for results) [phone]

## 2025-01-09 ENCOUNTER — TELEPHONE (OUTPATIENT)
Dept: ORTHOPEDICS | Facility: CLINIC | Age: 50
End: 2025-01-09
Payer: COMMERCIAL

## 2025-01-09 DIAGNOSIS — M54.12 CERVICAL RADICULOPATHY: Primary | ICD-10-CM

## 2025-01-09 RX ORDER — METHYLPREDNISOLONE 4 MG/1
4 TABLET ORAL
COMMUNITY
End: 2025-01-09 | Stop reason: SDUPTHER

## 2025-01-09 RX ORDER — METHYLPREDNISOLONE 4 MG/1
4 TABLET ORAL DAILY
Qty: 21 EACH | Refills: 0 | Status: SHIPPED | OUTPATIENT
Start: 2025-01-09

## 2025-01-13 NOTE — PROGRESS NOTES
Dmitri Haider M.D.   [facility]  Interventional Pain    New Patient Visit      Referring provider: No ref. provider found    Chief Complaint   Patient presents with    Neck Pain     Referred by Dr Villarreal, no prior injury or surgery, no prior cervical injections, aleve for relief currently on medrol dose pack, possible trigger point injection today, pt goal to get rid of pain to have a better quality of life,  possibly attend P.T.,  pain is worse at night which does not allow him to sleep through the night, pain level currently 6/10 upon waking pain level 10/10    Unable to obtain MRI has metal in neck        Assessment and Plan:     Inga Rayo is a 49 y.o. male with neck pain. A detailed and lengthy discussion was undertaken regarding the patient's presentation including history,  physical examination, past treatments, relevant laboratory, imaging results and future management strategies:     Assessment: The mentioned diagnosis is Stable and is accompanied by significant limitation to ADLs.  Patient presentation is suggestive of radiculopathy along the C6, C7 nerve roots.  At present we would like to proceed with conservative measures including physical therapy and medication management.  Patient verbalized understanding of the same.      ICD-10-CM ICD-9-CM   1. Cervical radiculopathy  M54.12 723.4   2. Degenerative disc disease, cervical  M50.30 722.4   3. Facet arthritis of cervical region  M47.812 721.0   4. Foreign body (FB) in soft tissue  M79.5 729.6       Cervical radiculopathy  -     gabapentin (NEURONTIN) 300 MG capsule; Take 1 capsule (300 mg total) by mouth every evening for 14 days, THEN 2 capsules (600 mg total) every evening for 14 days.  Dispense: 42 capsule; Refill: 0  -     Ambulatory Referral/Consult to Physical Therapy; Future; Expected date: 01/21/2025    Degenerative disc disease, cervical    Facet arthritis of cervical region    Foreign body (FB) in soft  tissue        Plan:  The plan was clearly communicated to the patient, who verbalized understanding of the same.     Diagnostics: Reviewed the followed diagnostic results  Labs/EMG: High Potassium at 5.6   Imaging:   MRI/CT:   12/30/2024 CT Cervical Spine Impression: Congenitally narrow canal.  C3-C4 small central disc herniation.  At C5-C6 there is posterior annular bulging that is accompanied by moderate bilateral neural foraminal narrowing.  Similar findings at C6-C7.  Xray:  12/27/2024 Xray Neck Soft Tissue Impression:  Metallic foreign body within the left side of the neck at the level of C6.  Degenerative changes in the cervical spine.  Medications:  Plan/Ordered:   Gabapentin 300 mg x 14 days at night and then 2 pills at 600 mg x 14 days  Diclofenac Gel/Voltaren Gel: Please apply 2g 2-3 times a day for the next 2-3 weeks   Current: Meloxicam 15 mg (unsure if it works, taking for a year), Medrol Dose Pack (mild pain relief for arm pain), Allopurinol  Previous: Diclofenac 50 mg tab  Anticoagulants/Antiplatelets: None  Non-Pharmacologic  Therapy: Recommended to start on PT as per Dr. Nir Villarreal note 12/02/2024, but patient did not go for it since he was concerned as it may increase the pain.  Provided a new referral at this visit.  Reassured the patient as this is the next step to strengthen the muscles in the neck region.  Patient verbalized understanding.  Pain Psychology: None  New Consults: None  Interventions:  Injections:  Plan/Ordered: None for this visit.  We will consider cervical interlaminar epidural injection in future as indicated if conservative measures fail.  Previous: LESI for lumbar spine, but none for cervical spine  Information on the procedure was provided to the patient today. Risks and benefits were discussed. All questions were answered.  Surgical Spine Interventions: None  Relevant Risk Factors for Interventions:  Medical-Surgical History/Devices: Injury to right 4th and 5th phalanx,  Hypothyroidism, HLD, HTN, Insomnia, Gout, lower back pain    Allergies: None  Other:   I reviewed the prior notes from each unique source dated 12/02/2024 with Dr. Nir Villarreal (Orthopedics)   Old Records: Decision was made to not obtain old records    Follow Up: Plan for patient to follow up  8 weeks following completion of physical therapy    Current Visit Imaging Interpretation:  I independently visualized/interpreted the following images dated 12/30/2024 CT Cervical Spine    Pertinent findings include: Congenitally narrow canal.  C3-C4 small central disc herniation.  At C5-C6 there is posterior annular bulging that is accompanied by moderate bilateral neural foraminal narrowing.  Similar findings at C6-C7.  Key Images:     The above plan and management options were discussed at length with patient. Patient is in agreement with the above and verbalized understanding.    - I discussed the goals of interventional chronic pain management with the patient on today's visit. We discussed a multimodal and systematic approach to pain.  This includes diagnostic and therapeutic injections, adjuvant pharmacologic treatment, physical therapy, and at times psychiatry.  I emphasized the importance of regular exercise, core strengthening and stretching, diet and weight loss as a cornerstone of long-term pain management.    - This condition does not require this patient to take time off of work, and the primary goal of our Pain Management services is to improve the patient's functional capacity.  - Patient Questions: Answered all of the patient's questions regarding diagnoses, therapy, treatment and next steps      History of Present Illness:     HPI Summary    Context/Inciting Event: Pain started few months ago, denies any trauma or fall prior to that.   Location: In the lower neck region  Radiation: Along C7 distribution on RUE. Denies radiation of pain to head.  Onset and Progress: Pain began insidiously 4-5 months ago and has  been worsening. This has significantly affected his ADL's as mentioned below. Pain is showing up more frequently and is more often during the evening.   Description/Character: Achy pain, come and goes, infrequently sharp pain  Frequency and Timing: Mostly in evenings  Pain Score: 5/10  Symptoms Worse With: Laying down, sitting down, looking down,   Symptoms Improved With: Looking up  Other:  Patient also noted to have metallic foreign body on the left side of the neck at the level of C6.  However patient does not note any pain related to that foreign body.    Work Related: no  Profession:  for 5 years    Associated Conditions:  Numbness: yes, along C7 nerve, confirmed with dermatome poster  Weakness: yes, difficulty with  strength, has dropped objects infrequently, for the past 4 months  Control of Bowel/Bladder (S3/4): no  Sleep problems: yes, difficulty with staying asleep also resulting in restlessness  H/o Depression and anxiety: no  Opiate use disorder: no  Inflammatory conditions like Rheumatoid/Ankylosing spondylitis/Psoriatic: no  Functional/Activity Limitations: yes, starts in morning, combing hair, brushing teeth, dressing with putting on pants, socks, wearing shirt, notes his work as  is difficult especially lifting objects and staying in one position with significant pain in his neck.     Patient denies night fever/night sweats, urinary incontinence, bowel incontinence, significant weight loss, significant motor weakness, and loss of sensations.    Previous Treatments:  Medications:   Current: Meloxicam 15 mg (unsure if it works, taking for a year), Medrol Dose Pack (mild pain relief for arm), Allopurinol  Previous: Diclofenac 50 mg tab  Anticoagulants/Antiplatelets: None    Physical Therapy/Home Exercise: no  Ice/Heat:no  TENS: yes, provided minimal relief. TEN's unit broke down  Acupuncture: no  Massage: no  Chiropractic: no      Interventional Procedures: Got a  intramuscular steroid injection that 2 months ago that did not alleviate pain  Spine Surgery: None  Other: None      Summary of Other Notes:  12/02/2024 with Dr. Nir Villarreal (Orthopedics)  History of present illness:  He  is a pleasant 49 y.o. year old male who presents with right sided neck and arm pain for the past month. Denies injury. He has tingling down the arm with numbness to the index and long fingers. He feels symptoms with neck flexion. He denies shoulder pain. He's had previous LESIs with good results, no intervention on the cervical spine yet.   Plan:  Medrol dose pack and Mobic sent to his pharmacy.  MRI cervical spine ordered. We will start him in formal therapy. Refer to pain management. He can follow up here if he has any issues.      Pain Disability Index:          1/14/2025     7:59 AM   Last 3 PDI Scores   Pain Disability Index (PDI) 46          Review:  Reviewed and consistent with medication use as prescribed.    Review of patient's allergies indicates:  No Known Allergies    Past Medical History:   Diagnosis Date    High cholesterol     Hypertension     Thyroid disease        Past Surgical History:   Procedure Laterality Date    ANKLE FRACTURE SURGERY          reports that he has never smoked. He has never been exposed to tobacco smoke. He has never used smokeless tobacco. He reports current alcohol use. He reports that he does not use drugs.    Family History   Problem Relation Name Age of Onset    Other (stents) Father         Current Outpatient Medications   Medication Sig    allopurinoL (ZYLOPRIM) 100 MG tablet Take 100 mg by mouth.    atorvastatin (LIPITOR) 80 MG tablet Take 80 mg by mouth.    cinacalcet (SENSIPAR) 30 MG Tab Take 30 mg by mouth.    levothyroxine (SYNTHROID) 25 MCG tablet Take 25 mcg by mouth.    lisinopriL (PRINIVIL,ZESTRIL) 40 MG tablet Take 40 mg by mouth.    lisinopriL-hydrochlorothiazide (PRINZIDE,ZESTORETIC) 20-12.5 mg per tablet Take 1 tablet by mouth.     "meloxicam (MOBIC) 15 MG tablet Take 15 mg by mouth daily as needed.    methylPREDNISolone (MEDROL DOSEPACK) 4 mg tablet Take 1 tablet (4 mg total) by mouth once daily. use as directed    zolpidem (AMBIEN) 10 mg Tab TAKE 1 TABLET BY MOUTH NIGHTLY AT BEDTIME FOR SLEEP    gabapentin (NEURONTIN) 300 MG capsule Take 1 capsule (300 mg total) by mouth every evening for 14 days, THEN 2 capsules (600 mg total) every evening for 14 days.    meloxicam (MOBIC) 15 MG tablet Take 1 tablet (15 mg total) by mouth once daily. (Patient not taking: Reported on 1/14/2025)     No current facility-administered medications for this visit.       Review of Systems:     Review of Systems   Constitutional: Negative.  Negative for chills, diaphoresis and fever.   HENT: Negative.     Eyes: Negative.    Respiratory: Negative.  Negative for shortness of breath and wheezing.    Cardiovascular: Negative.  Negative for chest pain and palpitations.   Gastrointestinal: Negative.  Negative for blood in stool.   Genitourinary:  Positive for frequency.   Musculoskeletal:         Refer to HPI   Skin: Negative.  Negative for rash.   Neurological:  Negative for tremors and speech change.   Endo/Heme/Allergies: Negative.  Does not bruise/bleed easily.   Psychiatric/Behavioral: Negative.         PHYSICAL EXAM:   Visit Vitals  /87 (BP Location: Left arm, Patient Position: Sitting)   Pulse 81   Temp 98.1 °F (36.7 °C) (Oral)   Ht 5' 9.5" (1.765 m)   Wt 93.9 kg (207 lb)   BMI 30.13 kg/m²       General Appearance: healthy, well developed, well nourished, appropriately dressed  Mental Status: Alert, oriented, thought content appropriate; Normal affect  Psych: Mood and affect appropriate  Head: Normocephalic, atraumatic  Eyes: Extraocular movements intact.   Neck: No asymmetry, masses or scars; supple; midline   Skin: Warm and dry; No rashes visible on exposed areas  Lung: Respiratory effort normal, symmetrical chest rise  Cardiovascular: RRR with palpation of " the radial artery.    Neuro:     Motor & Sensory:     Strength and Sensation:     Upper Extremity  Nerve C5 C6 C7 C8 T1   Muscle Groups Shoulder Abduction Elbow Flexion (Palm up) Elbow Flexion (Thumb up) Wrist Extension Elbow Extension Wrist Flexion Hand  Finger Abduction   Right 5/5 5/5 5/5 5/5 5/5 5/5 5/5 5/5   Left 5/5 5/5 5/5 5/5 5/5 5/5 5/5 5/5     Nerve Root (area) Light Touch    R L   C5  (Lateral arm below deltoid) 2 2   C6  (Thumb & Radial hand/forearm) 2 2   C7 (Finger 2,3,4) 1 2   C8 (Finger 5) 2 2   T1 (Medial elbow) 2 2       Reflexes:    R L   Biceps (C5) 2+ 2+   Brachioradialis (C6) 2+ 2+   Triceps (C7) 2+ 2+       Upper Tract Signs:    R L   Miles's Negative Negative   Plantar Response Down-going Down-going   Clonus Negative Negative     Gait:   Heel walking: Intact  Toe walking: Intact  Tandem walking: Intact      MSK:  Cervical Exam  Inspection:  No surgical scars, lesions, step-off deformity noted.  Palpation:  Tenderness to palpation along the right trapezius muscle.  ROM:  No limitation to range of motion or pain with any of the flexion-extension lateral bending or rotation movements.  Special Tests:   Facet Loading:  Negative bilaterally  Spurlings:  Negative bilaterally    Shoulder Exam  Inspection:  No scars, lesions, atrophy of muscle noted  Palpation:  Tenderness to palpation along the right shoulder  ROM:  No limitation to range of motion for flexion, extension, abduction, adduction, external rotation, internal rotation  Special Tests:   Empty Can test: Negative b/l  Scarf test: Negative b/l     Data     Imaging of Relevance:     12/30/2024 CT Cervical Spine         12/27/2024 Xray Neck Soft Tissue    Labs of Relevance:  Chemistry:  Lab Results   Component Value Date     11/08/2024    K 5.6 (H) 11/08/2024    CHLORIDE 107 06/15/2022    BUN 17.5 11/08/2024    CREATININE 0.92 11/08/2024    EGFRNORACEVR >60 11/08/2024    GLUCOSE 102 (H) 11/08/2024    CALCIUM 11.1 (H) 11/08/2024     "ALKPHOS 62 11/08/2024    LABPROT 7.0 11/08/2024    ALBUMIN 4.4 11/08/2024    AST 21 11/08/2024    ALT 34 11/08/2024    [id number] 24.2 (L) 06/17/2022        Lab Results   Component Value Date    HGBA1C 5.4 05/21/2024    HGBA1C 5.4 06/17/2022        Hematology:  Lab Results   Component Value Date    WBC 4.84 10/21/2023    HGB 14.8 10/21/2023    HCT 46.8 10/21/2023     10/21/2023    No results found for: "INR", "PROTIME"      Dmitri Haider MD  Interventional Pain Management  [facility]     Disclaimer:  This note was prepared using voice recognition system and is likely to have sound alike errors that may have been overlooked even after proof reading.  Please call me with any questions      "

## 2025-01-14 ENCOUNTER — OFFICE VISIT (OUTPATIENT)
Facility: CLINIC | Age: 50
End: 2025-01-14
Payer: COMMERCIAL

## 2025-01-14 VITALS
TEMPERATURE: 98 F | BODY MASS INDEX: 29.63 KG/M2 | WEIGHT: 207 LBS | HEART RATE: 81 BPM | SYSTOLIC BLOOD PRESSURE: 122 MMHG | HEIGHT: 70 IN | DIASTOLIC BLOOD PRESSURE: 87 MMHG

## 2025-01-14 DIAGNOSIS — M50.30 DEGENERATIVE DISC DISEASE, CERVICAL: ICD-10-CM

## 2025-01-14 DIAGNOSIS — M79.5 FOREIGN BODY (FB) IN SOFT TISSUE: ICD-10-CM

## 2025-01-14 DIAGNOSIS — M47.812 FACET ARTHRITIS OF CERVICAL REGION: ICD-10-CM

## 2025-01-14 DIAGNOSIS — M54.12 CERVICAL RADICULOPATHY: Primary | ICD-10-CM

## 2025-01-14 PROCEDURE — 3079F DIAST BP 80-89 MM HG: CPT | Mod: CPTII,,, | Performed by: STUDENT IN AN ORGANIZED HEALTH CARE EDUCATION/TRAINING PROGRAM

## 2025-01-14 PROCEDURE — 3008F BODY MASS INDEX DOCD: CPT | Mod: CPTII,,, | Performed by: STUDENT IN AN ORGANIZED HEALTH CARE EDUCATION/TRAINING PROGRAM

## 2025-01-14 PROCEDURE — 99204 OFFICE O/P NEW MOD 45 MIN: CPT | Mod: ,,, | Performed by: STUDENT IN AN ORGANIZED HEALTH CARE EDUCATION/TRAINING PROGRAM

## 2025-01-14 PROCEDURE — 1159F MED LIST DOCD IN RCRD: CPT | Mod: CPTII,,, | Performed by: STUDENT IN AN ORGANIZED HEALTH CARE EDUCATION/TRAINING PROGRAM

## 2025-01-14 PROCEDURE — 3074F SYST BP LT 130 MM HG: CPT | Mod: CPTII,,, | Performed by: STUDENT IN AN ORGANIZED HEALTH CARE EDUCATION/TRAINING PROGRAM

## 2025-01-14 RX ORDER — LISINOPRIL 40 MG/1
40 TABLET ORAL
COMMUNITY
Start: 2024-10-12

## 2025-01-14 RX ORDER — GABAPENTIN 300 MG/1
CAPSULE ORAL
Qty: 42 CAPSULE | Refills: 0 | Status: SHIPPED | OUTPATIENT
Start: 2025-01-14 | End: 2025-02-11

## 2025-01-22 ENCOUNTER — TELEPHONE (OUTPATIENT)
Dept: NEUROSURGERY | Facility: CLINIC | Age: 50
End: 2025-01-22
Payer: COMMERCIAL

## 2025-01-22 NOTE — TELEPHONE ENCOUNTER
- 10:43a TAKEN  Caller...Payton Rayo  Patient Name...Inga Rayo  ...1975  Phone...[phone]  Details...pt has 2 bulging discs, was given Gabapentin, Voltaren gel and virtual PT,  and is still having back pain

## 2025-01-23 NOTE — TELEPHONE ENCOUNTER
Telephone Call    Patient noted to have pain a couple of days ago when he called in for the office.  He notes that his pain has alleviated for the last 3-4 days.  We discussed that patient should continue up titrating his gabapentin for the time being. Discussed with the patient to continue using gabapentin 600 mg at night, consider adding 300 mg dose next week with 600 mg dose at night and then 600 mg BID the week after.  We also emphasized to continue using diclofenac gel 2 to 3 times a day for the next 2-3 weeks to find maximal benefit if any.    Patient verbalized understanding of the same and we will reach back to us in 2 weeks.    Regards  Dmitri Haider MD

## 2025-02-01 ENCOUNTER — LAB VISIT (OUTPATIENT)
Dept: LAB | Facility: HOSPITAL | Age: 50
End: 2025-02-01
Attending: FAMILY MEDICINE
Payer: COMMERCIAL

## 2025-02-01 DIAGNOSIS — M25.50 PAIN IN JOINT, MULTIPLE SITES: ICD-10-CM

## 2025-02-01 DIAGNOSIS — M54.50 LOW BACK PAIN: ICD-10-CM

## 2025-02-01 DIAGNOSIS — I10 ESSENTIAL HYPERTENSION, MALIGNANT: ICD-10-CM

## 2025-02-01 DIAGNOSIS — M25.511 RIGHT SHOULDER PAIN: ICD-10-CM

## 2025-02-01 DIAGNOSIS — M10.9 GOUT, UNSPECIFIED CAUSE, UNSPECIFIED CHRONICITY, UNSPECIFIED SITE: Primary | ICD-10-CM

## 2025-02-01 DIAGNOSIS — E87.5 HYPERKALEMIA: ICD-10-CM

## 2025-02-01 DIAGNOSIS — G47.00 INSOMNIA, UNSPECIFIED TYPE: ICD-10-CM

## 2025-02-01 DIAGNOSIS — E21.3 HYPERPARATHYROIDISM, UNSPECIFIED: ICD-10-CM

## 2025-02-01 DIAGNOSIS — E83.52 HYPERCALCEMIA: ICD-10-CM

## 2025-02-01 DIAGNOSIS — K62.5 HEMORRHAGE OF RECTUM AND ANUS: ICD-10-CM

## 2025-02-01 DIAGNOSIS — E78.5 HYPERLIPIDEMIA, UNSPECIFIED HYPERLIPIDEMIA TYPE: ICD-10-CM

## 2025-02-01 DIAGNOSIS — E03.9 PRIMARY HYPOTHYROIDISM: ICD-10-CM

## 2025-02-01 DIAGNOSIS — R06.83 SNORING: ICD-10-CM

## 2025-02-01 DIAGNOSIS — H93.19 SUBJECTIVE TINNITUS, UNSPECIFIED LATERALITY: ICD-10-CM

## 2025-02-01 LAB
ALBUMIN SERPL-MCNC: 3.9 G/DL (ref 3.5–5)
ALBUMIN/GLOB SERPL: 1.4 RATIO (ref 1.1–2)
ALP SERPL-CCNC: 57 UNIT/L (ref 40–150)
ALT SERPL-CCNC: 38 UNIT/L (ref 0–55)
ANION GAP SERPL CALC-SCNC: 6 MEQ/L
AST SERPL-CCNC: 24 UNIT/L (ref 5–34)
BILIRUB SERPL-MCNC: 1.4 MG/DL
BUN SERPL-MCNC: 17.6 MG/DL (ref 8.9–20.6)
CALCIUM SERPL-MCNC: 10.2 MG/DL (ref 8.4–10.2)
CHLORIDE SERPL-SCNC: 113 MMOL/L (ref 98–107)
CHOLEST SERPL-MCNC: 157 MG/DL
CHOLEST/HDLC SERPL: 3 {RATIO} (ref 0–5)
CO2 SERPL-SCNC: 25 MMOL/L (ref 22–29)
CREAT SERPL-MCNC: 0.96 MG/DL (ref 0.72–1.25)
CREAT/UREA NIT SERPL: 18
GFR SERPLBLD CREATININE-BSD FMLA CKD-EPI: >60 ML/MIN/1.73/M2
GLOBULIN SER-MCNC: 2.7 GM/DL (ref 2.4–3.5)
GLUCOSE SERPL-MCNC: 119 MG/DL (ref 74–100)
HDLC SERPL-MCNC: 49 MG/DL (ref 35–60)
LDLC SERPL CALC-MCNC: 90 MG/DL (ref 50–140)
POTASSIUM SERPL-SCNC: 5.3 MMOL/L (ref 3.5–5.1)
PROT SERPL-MCNC: 6.6 GM/DL (ref 6.4–8.3)
PTH-INTACT SERPL-MCNC: 104.2 PG/ML (ref 8.7–77)
SODIUM SERPL-SCNC: 144 MMOL/L (ref 136–145)
TRIGL SERPL-MCNC: 91 MG/DL (ref 34–140)
VLDLC SERPL CALC-MCNC: 18 MG/DL

## 2025-02-01 PROCEDURE — 83970 ASSAY OF PARATHORMONE: CPT

## 2025-02-01 PROCEDURE — 80053 COMPREHEN METABOLIC PANEL: CPT

## 2025-02-01 PROCEDURE — 80061 LIPID PANEL: CPT

## 2025-02-01 PROCEDURE — 36415 COLL VENOUS BLD VENIPUNCTURE: CPT

## 2025-03-10 NOTE — PROGRESS NOTES
Dmitri Haider M.D.   [facility]  Interventional Pain    Established Patient Visit    Chief Complaint   Patient presents with    Neck Pain     That radiates down right shoulder to the right arm and fingers.  He attend PT virtually per his insurance.  He was on Gabapentin, and it helped.  He ran out and would like a refill for prn. Pain Scale:  3/10 some days.  Today: 0/10.       Assessment and Plan:     Inga Rayo is a 49 y.o. male with neck pain. A detailed and lengthy discussion was undertaken regarding the patient's presentation including history,  physical examination, past treatments, relevant laboratory, imaging results and future management strategies:     Assessment: The mentioned diagnosis is Stable and is accompanied by significant limitation to ADLs.  Patient presentation is suggestive of radiculopathy along the C6, C7 nerve roots.  At present we would like to proceed with conservative measures.  Patient notes that he has had significant alleviation in his pain symptoms following physical therapy and medication management with gabapentin.  Patient has not had any change in pain despite stopping gabapentin 3 weeks ago.  Patient has also noted significant improvement in his functionality.  Given the improvement we will provide another refill of gabapentin for as-needed basis.  We discussed about possible interventional management in future if the pain return or was not optimize with medication management.  We will follow-up in on as-needed basis.        ICD-10-CM ICD-9-CM   1. Cervical radiculopathy  M54.12 723.4         Cervical radiculopathy  -     gabapentin (NEURONTIN) 300 MG capsule; Take 1 capsule (300 mg total) by mouth 3 (three) times daily.  Dispense: 90 capsule; Refill: 0      Plan:  The plan was clearly communicated to the patient, who verbalized understanding of the same.     Diagnostics: Reviewed the followed diagnostic results  Labs/EMG: High Potassium at 5.6    Imaging:   MRI/CT:   12/30/2024 CT Cervical Spine Impression: Congenitally narrow canal.  C3-C4 small central disc herniation.  At C5-C6 there is posterior annular bulging that is accompanied by moderate bilateral neural foraminal narrowing.  Similar findings at C6-C7.  Xray:  12/27/2024 Xray Neck Soft Tissue Impression:  Metallic foreign body within the left side of the neck at the level of C6.  Degenerative changes in the cervical spine.  Medications:  Plan/Ordered:   Gabapentin 300 mg TID x 30 days provided at this office visit  Diclofenac Gel/Voltaren Gel: Please apply 2g 2-3 times a day for the next 2-3 weeks   Current: Meloxicam 15 mg (unsure if it works, taking for a year), Medrol Dose Pack (mild pain relief for arm pain), Allopurinol  Previous: Diclofenac 50 mg tab  Anticoagulants/Antiplatelets: None  Non-Pharmacologic  Therapy:  Patient completed physical therapy for his neck pain which provided him significant relief with respect to pain and also improvement with respect to range of motion across the cervical spine.  Patient had no pain at this office visit.  Pain Psychology: None  New Consults: None  Interventions:  Injections:  Plan/Ordered: None for this visit.  We will consider cervical interlaminar epidural injection in future as indicated if conservative measures fail.  Previous: LESI for lumbar spine, but none for cervical spine  Information on the procedure was provided to the patient today. Risks and benefits were discussed. All questions were answered.  Surgical Spine Interventions: None  Relevant Risk Factors for Interventions:  Medical-Surgical History/Devices: Injury to right 4th and 5th phalanx, Hypothyroidism, HLD, HTN, Insomnia, Gout, lower back pain    Allergies: None  Other:   I reviewed the prior notes from each unique source dated 12/02/2024 with Dr. Nir Villarreal (Orthopedics)   Old Records: Decision was made to not obtain old records    Follow Up: Plan for patient to follow up  on as  needed basis in future    Current Visit Imaging Interpretation:  I independently visualized/interpreted the following images dated 12/30/2024 CT Cervical Spine    Pertinent findings include: Congenitally narrow canal.  C3-C4 small central disc herniation.  At C5-C6 there is posterior annular bulging that is accompanied by moderate bilateral neural foraminal narrowing.  Similar findings at C6-C7.  Key Images:     The above plan and management options were discussed at length with patient. Patient is in agreement with the above and verbalized understanding.    - I discussed the goals of interventional chronic pain management with the patient on today's visit. We discussed a multimodal and systematic approach to pain.  This includes diagnostic and therapeutic injections, adjuvant pharmacologic treatment, physical therapy, and at times psychiatry.  I emphasized the importance of regular exercise, core strengthening and stretching, diet and weight loss as a cornerstone of long-term pain management.    - This condition does not require this patient to take time off of work, and the primary goal of our Pain Management services is to improve the patient's functional capacity.  - Patient Questions: Answered all of the patient's questions regarding diagnoses, therapy, treatment and next steps    Interval History:     03/11/2025  Pain Progress:  Pain Score:  Patient has no pain at this office visit  Functionality:  Patient has noted significant improvement in his symptoms especially range of motion across cervical spine following completion of physical therapy for neck pain and also gabapentin medication dosing  Medications:  Gabapentin: New 30 day prescription provided at this visit for as needed basis  New Diagnostic Labs/Imaging: None for the visit  Therapy:  Patient has completed physical therapy for neck pain that improved his pain and functionality across the cervical spine  Intervention:  We will offer cervical  interlaminar injections in future if medication management and physical therapy are inadequate to provide optimal pain relief.      History of Present Illness:     HPI Summary    Context/Inciting Event: Pain started few months ago, denies any trauma or fall prior to that.   Location: In the lower neck region  Radiation: Along C7 distribution on RUE. Denies radiation of pain to head.  Onset and Progress: Pain began insidiously 4-5 months ago and has been worsening. This has significantly affected his ADL's as mentioned below. Pain is showing up more frequently and is more often during the evening.   Description/Character: Achy pain, come and goes, infrequently sharp pain  Frequency and Timing: Mostly in evenings  Pain Score: 5/10  Symptoms Worse With: Laying down, sitting down, looking down,   Symptoms Improved With: Looking up  Other:  Patient also noted to have metallic foreign body on the left side of the neck at the level of C6.  However patient does not note any pain related to that foreign body.    Work Related: no  Profession:  for 5 years    Associated Conditions:  Numbness: yes, along C7 nerve, confirmed with dermatome poster  Weakness: yes, difficulty with  strength, has dropped objects infrequently, for the past 4 months  Control of Bowel/Bladder (S3/4): no  Sleep problems: yes, difficulty with staying asleep also resulting in restlessness  H/o Depression and anxiety: no  Opiate use disorder: no  Inflammatory conditions like Rheumatoid/Ankylosing spondylitis/Psoriatic: no  Functional/Activity Limitations: yes, starts in morning, combing hair, brushing teeth, dressing with putting on pants, socks, wearing shirt, notes his work as  is difficult especially lifting objects and staying in one position with significant pain in his neck.     Patient denies night fever/night sweats, urinary incontinence, bowel incontinence, significant weight loss, significant motor weakness, and  loss of sensations.    Previous Treatments:  Medications:   Current: Meloxicam 15 mg (unsure if it works, taking for a year), Medrol Dose Pack (mild pain relief for arm), Allopurinol  Previous: Diclofenac 50 mg tab  Anticoagulants/Antiplatelets: None    Physical Therapy/Home Exercise: no  Ice/Heat:no  TENS: yes, provided minimal relief. TEN's unit broke down  Acupuncture: no  Massage: no  Chiropractic: no      Interventional Procedures: Got a intramuscular steroid injection that 2 months ago that did not alleviate pain  Spine Surgery: None  Other: None      Summary of Other Notes:  12/02/2024 with Dr. Nir Villarreal (Orthopedics)  History of present illness:  He  is a pleasant 49 y.o. year old male who presents with right sided neck and arm pain for the past month. Denies injury. He has tingling down the arm with numbness to the index and long fingers. He feels symptoms with neck flexion. He denies shoulder pain. He's had previous LESIs with good results, no intervention on the cervical spine yet.   Plan:  Medrol dose pack and Mobic sent to his pharmacy.  MRI cervical spine ordered. We will start him in formal therapy. Refer to pain management. He can follow up here if he has any issues.      Pain Disability Index:          3/11/2025     8:16 AM 1/14/2025     7:59 AM   Last 3 PDI Scores   Pain Disability Index (PDI) 28 46          Review:  Reviewed and consistent with medication use as prescribed.    Review of patient's allergies indicates:  No Known Allergies    Past Medical History:   Diagnosis Date    High cholesterol     Hypertension     Thyroid disease        Past Surgical History:   Procedure Laterality Date    ANKLE FRACTURE SURGERY          reports that he has never smoked. He has never been exposed to tobacco smoke. He has never used smokeless tobacco. He reports current alcohol use. He reports that he does not use drugs.    Family History   Problem Relation Name Age of Onset    Other (stents) Father          Current Outpatient Medications   Medication Sig    allopurinoL (ZYLOPRIM) 100 MG tablet Take 100 mg by mouth.    atorvastatin (LIPITOR) 80 MG tablet Take 80 mg by mouth.    cinacalcet (SENSIPAR) 30 MG Tab Take 30 mg by mouth.    levothyroxine (SYNTHROID) 25 MCG tablet Take 25 mcg by mouth.    lisinopriL (PRINIVIL,ZESTRIL) 40 MG tablet Take 40 mg by mouth.    lisinopriL-hydrochlorothiazide (PRINZIDE,ZESTORETIC) 20-12.5 mg per tablet Take 1 tablet by mouth.    zolpidem (AMBIEN) 10 mg Tab TAKE 1 TABLET BY MOUTH NIGHTLY AT BEDTIME FOR SLEEP    gabapentin (NEURONTIN) 300 MG capsule Take 1 capsule (300 mg total) by mouth 3 (three) times daily.    meloxicam (MOBIC) 15 MG tablet Take 15 mg by mouth daily as needed. (Patient not taking: Reported on 3/11/2025)    meloxicam (MOBIC) 15 MG tablet Take 1 tablet (15 mg total) by mouth once daily. (Patient not taking: Reported on 3/11/2025)    methylPREDNISolone (MEDROL DOSEPACK) 4 mg tablet Take 1 tablet (4 mg total) by mouth once daily. use as directed (Patient not taking: Reported on 3/11/2025)     No current facility-administered medications for this visit.       Review of Systems:     Review of Systems   Constitutional: Negative.  Negative for chills, diaphoresis and fever.   HENT: Negative.     Eyes: Negative.    Respiratory: Negative.  Negative for shortness of breath and wheezing.    Cardiovascular: Negative.  Negative for chest pain and palpitations.   Gastrointestinal: Negative.  Negative for blood in stool.   Genitourinary:  Positive for frequency.   Musculoskeletal:         Refer to HPI   Skin: Negative.  Negative for rash.   Neurological:  Negative for tremors and speech change.   Endo/Heme/Allergies: Negative.  Does not bruise/bleed easily.   Psychiatric/Behavioral: Negative.         PHYSICAL EXAM:   Visit Vitals  /83 (BP Location: Left arm, Patient Position: Sitting)   Pulse 88   SpO2 96%         General Appearance: healthy, well developed, well  nourished, appropriately dressed  Mental Status: Alert, oriented, thought content appropriate; Normal affect  Psych: Mood and affect appropriate  Head: Normocephalic, atraumatic  Eyes: Extraocular movements intact.   Neck: No asymmetry, masses or scars; supple; midline   Skin: Warm and dry; No rashes visible on exposed areas  Lung: Respiratory effort normal, symmetrical chest rise  Cardiovascular: RRR with palpation of the radial artery.    Neuro:     Motor & Sensory:     Strength and Sensation:     Upper Extremity  Nerve C5 C6 C7 C8 T1   Muscle Groups Shoulder Abduction Elbow Flexion (Palm up) Elbow Flexion (Thumb up) Wrist Extension Elbow Extension Wrist Flexion Hand  Finger Abduction   Right 5/5 5/5 5/5 5/5 5/5 5/5 5/5 5/5   Left 5/5 5/5 5/5 5/5 5/5 5/5 5/5 5/5     Nerve Root (area) Light Touch    R L   C5  (Lateral arm below deltoid) 2 2   C6  (Thumb & Radial hand/forearm) 2 2   C7 (Finger 2,3,4) 1 2   C8 (Finger 5) 2 2   T1 (Medial elbow) 2 2       Reflexes:    R L   Biceps (C5) 2+ 2+   Brachioradialis (C6) 2+ 2+   Triceps (C7) 2+ 2+       Upper Tract Signs:    R L   Miles's Negative Negative   Plantar Response Down-going Down-going   Clonus Negative Negative     Gait:   Heel walking: Intact  Toe walking: Intact  Tandem walking: Intact      MSK:  Cervical Exam  Inspection:  No surgical scars, lesions, step-off deformity noted.  Palpation:  Tenderness to palpation along the right trapezius muscle.  ROM:  No limitation to range of motion or pain with any of the flexion-extension lateral bending or rotation movements.  Special Tests:   Facet Loading:  Negative bilaterally  Spurlings:  Negative bilaterally    Shoulder Exam  Inspection:  No scars, lesions, atrophy of muscle noted  Palpation:  Tenderness to palpation along the right shoulder  ROM:  No limitation to range of motion for flexion, extension, abduction, adduction, external rotation, internal rotation  Special Tests:   Empty Can test: Negative  "b/l  Scarf test: Negative b/l     Data     Imaging of Relevance:     12/30/2024 CT Cervical Spine         12/27/2024 Xray Neck Soft Tissue    Labs of Relevance:  Chemistry:  Lab Results   Component Value Date     02/01/2025    K 5.3 (H) 02/01/2025    CHLORIDE 107 06/15/2022    BUN 17.6 02/01/2025    CREATININE 0.96 02/01/2025    EGFRNORACEVR >60 02/01/2025    GLUCOSE 119 (H) 02/01/2025    CALCIUM 10.2 02/01/2025    ALKPHOS 57 02/01/2025    LABPROT 6.6 02/01/2025    ALBUMIN 3.9 02/01/2025    AST 24 02/01/2025    ALT 38 02/01/2025    [id number] 24.2 (L) 06/17/2022        Lab Results   Component Value Date    HGBA1C 5.4 05/21/2024    HGBA1C 5.4 06/17/2022        Hematology:  Lab Results   Component Value Date    WBC 4.84 10/21/2023    HGB 14.8 10/21/2023    HCT 46.8 10/21/2023     10/21/2023    No results found for: "INR", "PROTIME"      Dmitri Haider MD  Interventional Pain Management  [facility]     Disclaimer:  This note was prepared using voice recognition system and is likely to have sound alike errors that may have been overlooked even after proof reading.  Please call me with any questions      "

## 2025-03-11 ENCOUNTER — OFFICE VISIT (OUTPATIENT)
Facility: CLINIC | Age: 50
End: 2025-03-11
Payer: COMMERCIAL

## 2025-03-11 VITALS — SYSTOLIC BLOOD PRESSURE: 120 MMHG | OXYGEN SATURATION: 96 % | HEART RATE: 88 BPM | DIASTOLIC BLOOD PRESSURE: 83 MMHG

## 2025-03-11 DIAGNOSIS — M54.12 CERVICAL RADICULOPATHY: ICD-10-CM

## 2025-03-11 PROCEDURE — 4010F ACE/ARB THERAPY RXD/TAKEN: CPT | Mod: CPTII,,, | Performed by: STUDENT IN AN ORGANIZED HEALTH CARE EDUCATION/TRAINING PROGRAM

## 2025-03-11 PROCEDURE — 3079F DIAST BP 80-89 MM HG: CPT | Mod: CPTII,,, | Performed by: STUDENT IN AN ORGANIZED HEALTH CARE EDUCATION/TRAINING PROGRAM

## 2025-03-11 PROCEDURE — 3074F SYST BP LT 130 MM HG: CPT | Mod: CPTII,,, | Performed by: STUDENT IN AN ORGANIZED HEALTH CARE EDUCATION/TRAINING PROGRAM

## 2025-03-11 PROCEDURE — 99214 OFFICE O/P EST MOD 30 MIN: CPT | Mod: ,,, | Performed by: STUDENT IN AN ORGANIZED HEALTH CARE EDUCATION/TRAINING PROGRAM

## 2025-03-11 RX ORDER — GABAPENTIN 300 MG/1
300 CAPSULE ORAL 3 TIMES DAILY
Qty: 90 CAPSULE | Refills: 0 | Status: SHIPPED | OUTPATIENT
Start: 2025-03-11 | End: 2025-04-10

## 2025-07-31 ENCOUNTER — LAB VISIT (OUTPATIENT)
Dept: LAB | Facility: HOSPITAL | Age: 50
End: 2025-07-31
Attending: FAMILY MEDICINE
Payer: COMMERCIAL

## 2025-07-31 DIAGNOSIS — Z00.00 ROUTINE GENERAL MEDICAL EXAMINATION AT A HEALTH CARE FACILITY: Primary | ICD-10-CM

## 2025-07-31 LAB
ALBUMIN SERPL-MCNC: 4 G/DL (ref 3.5–5)
ALBUMIN/GLOB SERPL: 1.3 RATIO (ref 1.1–2)
ALP SERPL-CCNC: 66 UNIT/L (ref 40–150)
ALT SERPL-CCNC: 41 UNIT/L (ref 0–55)
ANION GAP SERPL CALC-SCNC: 3 MEQ/L
AST SERPL-CCNC: 24 UNIT/L (ref 11–45)
BILIRUB SERPL-MCNC: 1.7 MG/DL
BILIRUB UR QL STRIP.AUTO: NEGATIVE
BUN SERPL-MCNC: 18.7 MG/DL (ref 8.9–20.6)
CALCIUM SERPL-MCNC: 10.8 MG/DL (ref 8.4–10.2)
CHLORIDE SERPL-SCNC: 109 MMOL/L (ref 98–107)
CHOLEST SERPL-MCNC: 145 MG/DL
CHOLEST/HDLC SERPL: 3 {RATIO} (ref 0–5)
CLARITY UR: CLEAR
CO2 SERPL-SCNC: 27 MMOL/L (ref 22–29)
COLOR UR AUTO: NORMAL
CREAT SERPL-MCNC: 0.8 MG/DL (ref 0.72–1.25)
CREAT/UREA NIT SERPL: 23
ERYTHROCYTE [DISTWIDTH] IN BLOOD BY AUTOMATED COUNT: 13 % (ref 11.5–17)
EST. AVERAGE GLUCOSE BLD GHB EST-MCNC: 116.9 MG/DL
GFR SERPLBLD CREATININE-BSD FMLA CKD-EPI: >60 ML/MIN/1.73/M2
GLOBULIN SER-MCNC: 3 GM/DL (ref 2.4–3.5)
GLUCOSE SERPL-MCNC: 110 MG/DL (ref 74–100)
GLUCOSE UR QL STRIP: NEGATIVE
HBA1C MFR BLD: 5.7 %
HCT VFR BLD AUTO: 47.5 % (ref 42–52)
HDLC SERPL-MCNC: 46 MG/DL (ref 35–60)
HGB BLD-MCNC: 15.5 G/DL (ref 14–18)
HGB UR QL STRIP: NEGATIVE
KETONES UR QL STRIP: NEGATIVE
LDLC SERPL CALC-MCNC: 82 MG/DL (ref 50–140)
LEUKOCYTE ESTERASE UR QL STRIP: NEGATIVE
MCH RBC QN AUTO: 29.1 PG (ref 27–31)
MCHC RBC AUTO-ENTMCNC: 32.6 G/DL (ref 33–36)
MCV RBC AUTO: 89.3 FL (ref 80–94)
NITRITE UR QL STRIP: NEGATIVE
PH UR STRIP: 6.5 [PH]
PLATELET # BLD AUTO: 278 X10(3)/MCL (ref 130–400)
PMV BLD AUTO: 9.2 FL (ref 7.4–10.4)
POTASSIUM SERPL-SCNC: 5.6 MMOL/L (ref 3.5–5.1)
PROT SERPL-MCNC: 7 GM/DL (ref 6.4–8.3)
PROT UR QL STRIP: NEGATIVE
PTH-INTACT SERPL-MCNC: 111.4 PG/ML (ref 8.7–77)
RBC # BLD AUTO: 5.32 X10(6)/MCL (ref 4.7–6.1)
SODIUM SERPL-SCNC: 139 MMOL/L (ref 136–145)
SP GR UR STRIP.AUTO: 1.02 (ref 1–1.03)
TRIGL SERPL-MCNC: 87 MG/DL (ref 34–140)
TSH SERPL-ACNC: 1.84 UIU/ML (ref 0.35–4.94)
URATE SERPL-MCNC: 5.8 MG/DL (ref 3.5–7.2)
UROBILINOGEN UR STRIP-ACNC: 1
VLDLC SERPL CALC-MCNC: 17 MG/DL
WBC # BLD AUTO: 4.57 X10(3)/MCL (ref 4.5–11.5)

## 2025-07-31 PROCEDURE — 81003 URINALYSIS AUTO W/O SCOPE: CPT

## 2025-07-31 PROCEDURE — 84550 ASSAY OF BLOOD/URIC ACID: CPT

## 2025-07-31 PROCEDURE — 80053 COMPREHEN METABOLIC PANEL: CPT

## 2025-07-31 PROCEDURE — 85027 COMPLETE CBC AUTOMATED: CPT

## 2025-07-31 PROCEDURE — 84443 ASSAY THYROID STIM HORMONE: CPT

## 2025-07-31 PROCEDURE — 83970 ASSAY OF PARATHORMONE: CPT

## 2025-07-31 PROCEDURE — 83036 HEMOGLOBIN GLYCOSYLATED A1C: CPT

## 2025-07-31 PROCEDURE — 36415 COLL VENOUS BLD VENIPUNCTURE: CPT

## 2025-07-31 PROCEDURE — 80061 LIPID PANEL: CPT
